# Patient Record
Sex: MALE
[De-identification: names, ages, dates, MRNs, and addresses within clinical notes are randomized per-mention and may not be internally consistent; named-entity substitution may affect disease eponyms.]

---

## 2024-09-24 PROBLEM — Z00.00 ENCOUNTER FOR PREVENTIVE HEALTH EXAMINATION: Status: ACTIVE | Noted: 2024-09-24

## 2024-09-30 ENCOUNTER — NON-APPOINTMENT (OUTPATIENT)
Age: 34
End: 2024-09-30

## 2024-10-01 ENCOUNTER — APPOINTMENT (OUTPATIENT)
Dept: NEUROSURGERY | Facility: CLINIC | Age: 34
End: 2024-10-01
Payer: COMMERCIAL

## 2024-10-01 VITALS
SYSTOLIC BLOOD PRESSURE: 113 MMHG | TEMPERATURE: 98.1 F | DIASTOLIC BLOOD PRESSURE: 64 MMHG | WEIGHT: 177 LBS | OXYGEN SATURATION: 98 % | HEART RATE: 105 BPM | HEIGHT: 70 IN | BODY MASS INDEX: 25.34 KG/M2

## 2024-10-01 DIAGNOSIS — S12.9XXA FRACTURE OF NECK, UNSPECIFIED, INITIAL ENCOUNTER: ICD-10-CM

## 2024-10-01 DIAGNOSIS — S22.009A UNSPECIFIED FRACTURE OF UNSPECIFIED THORACIC VERTEBRA, INITIAL ENCOUNTER FOR CLOSED FRACTURE: ICD-10-CM

## 2024-10-01 PROCEDURE — 99203 OFFICE O/P NEW LOW 30 MIN: CPT

## 2024-10-01 RX ORDER — GUAIFENESIN 1200 MG/1
TABLET, EXTENDED RELEASE ORAL
Refills: 0 | Status: ACTIVE | COMMUNITY

## 2024-10-01 RX ORDER — METHOCARBAMOL 1000 MG/1
TABLET, FILM COATED ORAL
Refills: 0 | Status: ACTIVE | COMMUNITY

## 2024-10-01 RX ORDER — ASPIRIN 325 MG/1
TABLET, FILM COATED ORAL
Refills: 0 | Status: ACTIVE | COMMUNITY
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
## 2024-10-03 NOTE — PHYSICAL EXAM
[de-identified] : Patient is awake and alert. Accompanied by his mother  Well appearing in no acute distress, sitting comfortably in exam chair with TLSO brace with cervical extension  Left leg brace present  Utilizing a walker for ambulation assistance Patient is interactive and appropriate Multiple healing lacerations/scars of left arm  RUE 5/5 Deltoid/Biceps/Triceps/WE/WF//IO LUE 5/5 Deltoid/Biceps/Triceps/WE/WF//IO RLE 5/5 HF/KE/KF/DF/PF/EHL LLE 5/5 KE/KF limited secondary to left lower extremity brace  Sensation intact to light touch Negative Garza's bilaterally Negative clonus bilaterally Narrow-based gait, utilizing a walker, and left lower extremity brace reflexes 2+

## 2024-10-03 NOTE — REASON FOR VISIT
[Initial Consultation] : an initial consultation for [Neck Pain] : neck pain [FreeTextEntry2] : Hospital follow up visit from [facility]

## 2024-10-03 NOTE — PHYSICAL EXAM
[de-identified] : Patient is awake and alert. Accompanied by his mother  Well appearing in no acute distress, sitting comfortably in exam chair with TLSO brace with cervical extension  Left leg brace present  Utilizing a walker for ambulation assistance Patient is interactive and appropriate Multiple healing lacerations/scars of left arm  RUE 5/5 Deltoid/Biceps/Triceps/WE/WF//IO LUE 5/5 Deltoid/Biceps/Triceps/WE/WF//IO RLE 5/5 HF/KE/KF/DF/PF/EHL LLE 5/5 KE/KF limited secondary to left lower extremity brace  Sensation intact to light touch Negative Garza's bilaterally Negative clonus bilaterally Narrow-based gait, utilizing a walker, and left lower extremity brace reflexes 2+
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
## 2024-10-03 NOTE — PHYSICAL EXAM
[de-identified] : Patient is awake and alert. Accompanied by his mother  Well appearing in no acute distress, sitting comfortably in exam chair with TLSO brace with cervical extension  Left leg brace present  Utilizing a walker for ambulation assistance Patient is interactive and appropriate Multiple healing lacerations/scars of left arm  RUE 5/5 Deltoid/Biceps/Triceps/WE/WF//IO LUE 5/5 Deltoid/Biceps/Triceps/WE/WF//IO RLE 5/5 HF/KE/KF/DF/PF/EHL LLE 5/5 KE/KF limited secondary to left lower extremity brace  Sensation intact to light touch Negative Garza's bilaterally Negative clonus bilaterally Narrow-based gait, utilizing a walker, and left lower extremity brace reflexes 2+
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
## 2024-11-12 ENCOUNTER — APPOINTMENT (OUTPATIENT)
Dept: NEUROSURGERY | Facility: CLINIC | Age: 34
End: 2024-11-12
Payer: COMMERCIAL

## 2024-11-12 VITALS
SYSTOLIC BLOOD PRESSURE: 104 MMHG | HEIGHT: 70 IN | DIASTOLIC BLOOD PRESSURE: 62 MMHG | TEMPERATURE: 99.3 F | BODY MASS INDEX: 24.34 KG/M2 | OXYGEN SATURATION: 100 % | WEIGHT: 170 LBS | HEART RATE: 94 BPM

## 2024-11-12 PROCEDURE — 99214 OFFICE O/P EST MOD 30 MIN: CPT

## 2024-11-26 ENCOUNTER — APPOINTMENT (OUTPATIENT)
Dept: NEUROSURGERY | Facility: CLINIC | Age: 34
End: 2024-11-26
Payer: COMMERCIAL

## 2024-11-26 PROCEDURE — 99441: CPT | Mod: 93
